# Patient Record
Sex: MALE
[De-identification: names, ages, dates, MRNs, and addresses within clinical notes are randomized per-mention and may not be internally consistent; named-entity substitution may affect disease eponyms.]

---

## 2022-07-27 ENCOUNTER — HOSPITAL ENCOUNTER (EMERGENCY)
Dept: HOSPITAL 5 - ED | Age: 27
Discharge: HOME | End: 2022-07-27
Payer: SELF-PAY

## 2022-07-27 VITALS — DIASTOLIC BLOOD PRESSURE: 74 MMHG | SYSTOLIC BLOOD PRESSURE: 128 MMHG

## 2022-07-27 DIAGNOSIS — Z72.89: ICD-10-CM

## 2022-07-27 DIAGNOSIS — J02.9: Primary | ICD-10-CM

## 2022-07-27 DIAGNOSIS — Z88.0: ICD-10-CM

## 2022-07-27 LAB
ALBUMIN SERPL-MCNC: 3.7 G/DL (ref 3.9–5)
ALT SERPL-CCNC: 20 UNITS/L (ref 7–56)
BUN SERPL-MCNC: 6 MG/DL (ref 9–20)
BUN/CREAT SERPL: 5 %
CALCIUM SERPL-MCNC: 8.7 MG/DL (ref 8.4–10.2)
HCT VFR BLD CALC: 41.4 % (ref 35.5–45.6)
HEMOLYSIS INDEX: 12
HGB BLD-MCNC: 13.8 GM/DL (ref 11.8–15.2)
MCHC RBC AUTO-ENTMCNC: 33 % (ref 32–34)
MCV RBC AUTO: 89 FL (ref 84–94)
PH UR STRIP: 6.5 [PH] (ref 5–7)
PLATELET # BLD: 201 K/MM3 (ref 140–440)
PROT UR STRIP-MCNC: (no result) MG/DL
RBC # BLD AUTO: 4.66 M/MM3 (ref 3.65–5.03)
RBC #/AREA URNS HPF: < 1 /HPF (ref 0–6)
UROBILINOGEN UR-MCNC: < 2 MG/DL (ref ?–2)
WBC #/AREA URNS HPF: 1 /HPF (ref 0–6)

## 2022-07-27 PROCEDURE — 96360 HYDRATION IV INFUSION INIT: CPT

## 2022-07-27 PROCEDURE — 81001 URINALYSIS AUTO W/SCOPE: CPT

## 2022-07-27 PROCEDURE — 82962 GLUCOSE BLOOD TEST: CPT

## 2022-07-27 PROCEDURE — 80053 COMPREHEN METABOLIC PANEL: CPT

## 2022-07-27 PROCEDURE — 83690 ASSAY OF LIPASE: CPT

## 2022-07-27 PROCEDURE — 96361 HYDRATE IV INFUSION ADD-ON: CPT

## 2022-07-27 PROCEDURE — 71046 X-RAY EXAM CHEST 2 VIEWS: CPT

## 2022-07-27 PROCEDURE — 36415 COLL VENOUS BLD VENIPUNCTURE: CPT

## 2022-07-27 PROCEDURE — 85027 COMPLETE CBC AUTOMATED: CPT

## 2022-07-27 PROCEDURE — 99284 EMERGENCY DEPT VISIT MOD MDM: CPT

## 2022-07-27 NOTE — XRAY REPORT
CHEST 2 VIEWS 



INDICATION / CLINICAL INFORMATION: fever



STUDY TIME: 1009



COMPARISON: None available.



FINDINGS:



SUPPORT DEVICES: None.



HEART / MEDIASTINUM: No significant abnormality. 



LUNGS / PLEURA: Lateral mid to lower chest is excluded from the PA view. Visualized portions of the l
fernando fields are clear. No pneumothorax. 



ADDITIONAL FINDINGS: No significant additional findings.





Signer Name: Brendon Martinez MD 

Signed: 7/27/2022 10:20 AM

Workstation Name: [employer]

## 2022-07-27 NOTE — EMERGENCY DEPARTMENT REPORT
ED Fever HPI





- General


Chief Complaint: Fever


Stated Complaint: FEVER/SOB/HEADACHE/BODY ACHE


PUI?: Yes


Time Seen by Provider: 07/27/22 09:52


Source: patient


Exam Limitations: no limitations





- History of Present Illness


Initial Comments: 





26 yo male comes to ER with chills and general malaise x 2 days. 





The only other symptom is a sore throat. 





Pt found to have fever in triage. 





Denies cough/n/v/d/abd pain/dysuria; no headache/light sensitivity / nuchal 

rigidity 





Is not immunized for covid19





no known ill contacts


Timing/Duration: other (2 d)


Fever Severity/Quality: subjective


Associated Symptoms: diaphoresis, sore throat.  denies: denies symptoms, 

abdominal pain, chest pain, confusion, cough, headache, muscle aches, 

nausea/vomiting, rash, shortness of breath, stiff neck, syncope, weakness, other





ED Review of Systems


ROS: 


Stated complaint: FEVER/SOB/HEADACHE/BODY ACHE


Other details as noted in HPI





Comment: All other systems reviewed and negative





ED Past Medical Hx





- Past Medical History


Previous Medical History?: No





- Surgical History


Past Surgical History?: No





- Family History


Family history: no significant





- Social History


Smoking Status: Never Smoker


Substance Use Type: Alcohol





- Medications


Home Medications: 


                                Home Medications











 Medication  Instructions  Recorded  Confirmed  Last Taken  Type


 


Azithromycin [Zithromax Z-COMFORT] 250 mg PO DAILY #6 tablet 07/27/22  Unknown Rx














ED Physical Exam





- General


Limitations: No Limitations


General appearance: alert, in no apparent distress





- Head


Head exam: Present: atraumatic, normocephalic





- Eye


Eye exam: Present: normal appearance





- ENT


ENT exam: Present: mucous membranes moist





- Neck


Neck exam: Present: normal inspection





- Respiratory


Respiratory exam: Present: normal lung sounds bilaterally.  Absent: respiratory 

distress





- Cardiovascular


Cardiovascular Exam: Present: regular rate, normal rhythm.  Absent: systolic 

murmur, diastolic murmur, rubs, gallop





- GI/Abdominal


GI/Abdominal exam: Present: soft, normal bowel sounds





- Rectal


Rectal exam: Present: deferred





- Extremities Exam


Extremities exam: Present: normal inspection





- Back Exam


Back exam: Present: normal inspection





- Neurological Exam


Neurological exam: Present: alert, oriented X3





- Psychiatric


Psychiatric exam: Present: normal affect, normal mood





- Skin


Skin exam: Present: warm, dry, intact, normal color.  Absent: rash





ED Course


                                   Vital Signs











  07/27/22 07/27/22





  09:28 16:50


 


Temperature 100.8 F H 98.5 F


 


Pulse Rate 121 H 88


 


Respiratory 18 16





Rate  


 


Blood Pressure 131/67 128/74





[Left]  


 


O2 Sat by Pulse 100 100





Oximetry  














ED Medical Decision Making





- Lab Data


Result diagrams: 


                                 07/27/22 10:38





                                 07/27/22 10:38





- Radiology Data


Radiology results: report reviewed, image reviewed





nap





- Medical Decision Making








Labs











  07/27/22 07/27/22 07/27/22





  10:38 10:38 12:50


 


WBC  5.4  


 


RBC  4.66  


 


Hgb  13.8  


 


Hct  41.4  


 


MCV  89  


 


MCH  30  


 


MCHC  33  


 


RDW  13.3  


 


Plt Count  201  


 


Sodium   137 


 


Potassium   4.3 


 


Chloride   101.9 


 


Carbon Dioxide   30 


 


Anion Gap   9 


 


BUN   6 L 


 


Creatinine   1.1 


 


Estimated GFR   > 60 


 


BUN/Creatinine Ratio   5 


 


Glucose   72 L 


 


POC Glucose    127 H


 


Calcium   8.7 


 


Total Bilirubin   0.20 


 


AST   25 


 


ALT   20 


 


Alkaline Phosphatase   72 


 


Total Protein   7.1 


 


Albumin   3.7 L 


 


Albumin/Globulin Ratio   1.1 


 


Lipase   25 


 


Urine Color   


 


Urine Turbidity   


 


Urine pH   


 


Ur Specific Gravity   


 


Urine Protein   


 


Urine Glucose (UA)   


 


Urine Ketones   


 


Urine Blood   


 


Urine Nitrite   


 


Ur Reducing Substances   


 


Urine Bilirubin   


 


Urine Ictotest   


 


Urine Urobilinogen   


 


Ur Leukocyte Esterase   


 


Urine WBC (Auto)   


 


Urine RBC (Auto)   


 


U Epithel Cells (Auto)   














  07/27/22





  15:37


 


WBC 


 


RBC 


 


Hgb 


 


Hct 


 


MCV 


 


MCH 


 


MCHC 


 


RDW 


 


Plt Count 


 


Sodium 


 


Potassium 


 


Chloride 


 


Carbon Dioxide 


 


Anion Gap 


 


BUN 


 


Creatinine 


 


Estimated GFR 


 


BUN/Creatinine Ratio 


 


Glucose 


 


POC Glucose 


 


Calcium 


 


Total Bilirubin 


 


AST 


 


ALT 


 


Alkaline Phosphatase 


 


Total Protein 


 


Albumin 


 


Albumin/Globulin Ratio 


 


Lipase 


 


Urine Color  Straw


 


Urine Turbidity  Clear


 


Urine pH  6.5


 


Ur Specific Gravity  1.005


 


Urine Protein  <15 mg/dl


 


Urine Glucose (UA)  Negative


 


Urine Ketones  Negative


 


Urine Blood  Negative


 


Urine Nitrite  Negative


 


Ur Reducing Substances  Not Reportable


 


Urine Bilirubin  Negative


 


Urine Ictotest  Not Reportable


 


Urine Urobilinogen  < 2.0


 


Ur Leukocyte Esterase  Negative


 


Urine WBC (Auto)  1.0


 


Urine RBC (Auto)  < 1.0


 


U Epithel Cells (Auto)  1.0











                                   Vital Signs











  07/27/22





  09:28


 


Temperature 100.8 F H


 


Pulse Rate 121 H


 


Respiratory 18





Rate 


 


Blood Pressure 131/67





[Left] 


 


O2 Sat by Pulse 100





Oximetry 








medicated with tylenol for fever- taking po with out difficulty 





ua noted





WBC noted





xray nap





2L NS while in ER








throat mild redness; no abscess; lungs clear; no sinus tenderness; TM normal


lungs CTA


abd snt; no cva tenderness


denies testicular pain; no discharge








on dc exam pt is ambulatory, taking po, in nad; asking to d/c





presumed URI/pharyngitis 





pt educated on symptom management and treatment and importance of follow up if 

not feeling better in 48 hours. 


he is taking po 


VS per provider HR 80, /70; temp 37.5





- Differential Diagnosis


ro uri/abd infection/uti/pylo/covid infection 


Critical care attestation.: 


If time is entered above; I have spent that time in minutes in the direct care 

of this critically ill patient, excluding procedure time.








ED Disposition


Clinical Impression: 


 Fever, Pharyngitis





Disposition: 01 HOME / SELF CARE / HOMELESS


Is pt being admited?: No


Does the pt Need Aspirin: No


Condition: Stable


Instructions:  Fever, Adult


Additional Instructions: 


meds as ordered today





follow up with pcp in 48 hours for recheck





referral below





stay well hydrated with water





motrin or tylenol for pain or fever





diet and activity as tolerated





Prescriptions: 


Azithromycin [Zithromax Z-COMFORT] 250 mg PO DAILY #6 tablet


Referrals: 


MIYA WALTERS MD [Staff Physician] - 3-5 Days


Forms:  Work/School Release Form(ED)


Time of Disposition: 11:12